# Patient Record
Sex: FEMALE | Race: BLACK OR AFRICAN AMERICAN | URBAN - METROPOLITAN AREA
[De-identification: names, ages, dates, MRNs, and addresses within clinical notes are randomized per-mention and may not be internally consistent; named-entity substitution may affect disease eponyms.]

---

## 2023-12-07 ENCOUNTER — OFFICE VISIT (OUTPATIENT)
Dept: ORTHOPEDIC SURGERY | Age: 38
End: 2023-12-07
Payer: OTHER GOVERNMENT

## 2023-12-07 VITALS — BODY MASS INDEX: 25.06 KG/M2 | WEIGHT: 185 LBS | HEIGHT: 72 IN

## 2023-12-07 DIAGNOSIS — S43.432A SUPERIOR GLENOID LABRUM LESION OF LEFT SHOULDER, INITIAL ENCOUNTER: Primary | ICD-10-CM

## 2023-12-07 DIAGNOSIS — M75.42 IMPINGEMENT SYNDROME OF LEFT SHOULDER: ICD-10-CM

## 2023-12-07 DIAGNOSIS — M75.112 NONTRAUMATIC INCOMPLETE TEAR OF LEFT ROTATOR CUFF: ICD-10-CM

## 2023-12-07 PROCEDURE — 99204 OFFICE O/P NEW MOD 45 MIN: CPT | Performed by: ORTHOPAEDIC SURGERY

## 2023-12-07 NOTE — PROGRESS NOTES
Name: Nate Gallagher  YOB: 1985  Gender: female  MRN: [id number]      CC: Shoulder Pain (L)       HPI: Nate Gallagher is a 41 y.o. female who presents with Shoulder Pain (L)  She is in the [employer] and has had shoulder pain for years. It became worse in January 2023. No specific injury. She has been in physical therapy for around a year with minimal improvement. She had an MRI in July 2023 through the [address] [place] [address]. she was told she needed surgery but tried her feet getting to Dr. Corine Putnam because she did not want to have surgery. She has failed all conservative management and is ready to talk about surgery. She is not taking any medicine for pain. She describes a dull achy pain in the joint that gets worse with activities. She states is starting to become where she feels very weak in this left shoulder and that is why she is here for surgical discussion. ROS/Meds/PSH/PMH/FH/SH: I personally reviewed the patients standard intake form. Below are the pertinents    Tobacco:  reports that she has never smoked. She has never used smokeless tobacco.  Diabetes: none  Other: none    Physical Examination:  General: no acute distress  Lungs: breathing easily  CV: regular rhythm by pulse  Left Shoulder: No previous surgical scars noted. No gross deformity noted. No bruising, rashes, wounds or sores noted. Tenderness along the bicipital groove. No tenderness at [facility] joint. Active forward flexion to 90 degrees without pain. Active abduction to 90 degrees without pain. Pain and buckling with scaption strength testing. 60 Degrees of active external rotation negative without pain. 5/5 external rotation strength. Can internally rotate to her back pocket but has pain. Positive Khan and Positive Neer signs. Positive [place]'s test.  Positive O'Joe's test.  Sensation intact along radial, ulnar and median nerve.  strength 5 out of 5 when compared to opposite side.   Distal radius pulse

## 2023-12-11 DIAGNOSIS — M75.42 IMPINGEMENT SYNDROME OF LEFT SHOULDER: ICD-10-CM

## 2023-12-11 DIAGNOSIS — S43.432A SUPERIOR GLENOID LABRUM LESION OF LEFT SHOULDER, INITIAL ENCOUNTER: Primary | ICD-10-CM

## 2023-12-11 DIAGNOSIS — M75.112 NONTRAUMATIC INCOMPLETE TEAR OF LEFT ROTATOR CUFF: ICD-10-CM

## 2023-12-11 DIAGNOSIS — M75.112 NONTRAUMATIC INCOMPLETE RUPTURE OF ROTATOR CUFF, LEFT: ICD-10-CM

## 2024-01-04 ENCOUNTER — OFFICE VISIT (OUTPATIENT)
Dept: ORTHOPEDIC SURGERY | Age: 39
End: 2024-01-04

## 2024-01-04 ENCOUNTER — CLINICAL DOCUMENTATION (OUTPATIENT)
Dept: ORTHOPEDIC SURGERY | Age: 39
End: 2024-01-04

## 2024-01-04 DIAGNOSIS — M75.112 NONTRAUMATIC INCOMPLETE TEAR OF LEFT ROTATOR CUFF: ICD-10-CM

## 2024-01-04 DIAGNOSIS — M25.512 LEFT ANTERIOR SHOULDER PAIN: ICD-10-CM

## 2024-01-04 DIAGNOSIS — M75.42 IMPINGEMENT SYNDROME OF LEFT SHOULDER: ICD-10-CM

## 2024-01-04 DIAGNOSIS — S43.432D SUPERIOR GLENOID LABRUM LESION OF LEFT SHOULDER, SUBSEQUENT ENCOUNTER: ICD-10-CM

## 2024-01-04 DIAGNOSIS — Z01.818 PREOP EXAMINATION: Primary | ICD-10-CM

## 2024-01-04 RX ORDER — OXYCODONE HYDROCHLORIDE 5 MG/1
5 TABLET ORAL EVERY 6 HOURS PRN
Qty: 30 TABLET | Refills: 0 | Status: SHIPPED | OUTPATIENT
Start: 2024-01-04 | End: 2024-01-09

## 2024-01-04 NOTE — PROGRESS NOTES
Name: Danica Grove  YOB: 1985  Gender: female  MRN: [id number]    CC: [place] Pain (L)     HPI: Danica Grove is a 38 y.o. female who presents with shoulder pain (L). She is here today for a pre-op appointment.     No current outpatient medications on file.  No Known Allergies  No past medical history on file.  No past surgical history on file.  No family history on file.  Social History     Socioeconomic History    Marital status:      Spouse name: Not on file    Number of children: Not on file    Years of education: Not on file    Highest education level: Not on file   Occupational History    Not on file   Tobacco Use    Smoking status: Never    Smokeless tobacco: Never   Vaping Use    Vaping Use: Never used   Substance and Sexual Activity    Alcohol use: Not on file    Drug use: Not on file    Sexual activity: Not on file   Other Topics Concern    Not on file   Social History Narrative    Not on file     Social Determinants of Health     Financial Resource Strain: Not on file   Food Insecurity: Not on file   Transportation Needs: Not on file   Physical Activity: Not on file   Stress: Not on file   Social Connections: Not on file   Intimate Partner Violence: Not on file   Housing Stability: Not on file        Tobacco:  reports that she has never smoked. She has never used smokeless tobacco.  Diabetes: none  Other: none    Physical Examination:  General: no acute distress  Lungs: breathing easily  CV: regular rhythm by pulse  HEENT: Head is normocephalic and atraumatic without tenderness, visible or palpable masses, depressions or scarring.  Visual acuity intact.  Nasal mucosa is pink and moist. Pinna, tragus and ear canal are nontender and without swelling  ABD: soft and nontender to palpation. Normal bowel sound  Left Shoulder:  No previous surgical scars noted.  No gross deformity noted.  No bruising, rashes, wounds or sores noted.   Tenderness along the bicipital 
release ninfa on the sling. The thumb and sling strap were then placed correctly on the sling. The shoulder straps were adjusted to insure correct fit which can involve trimming excess material. Once the sling is correctly fitted, the pillow is then placed at the waist line of the affected shoulder with the Velcro facing away from body. The sling is attached to the outside of the pillow, along the hook and loop strips. I then buckled the waist strap to the pillow and adjusted the strap. During the fitting process I explained how to detach the shoulder strap ninfa and open front panel to allow for proper hygiene. Patient was informed waist belt should stay in place at all times unless told otherwise by the Doctor or Physical Therapist    Patient read and signed documenting they understand and agree to [facility]'s current DME return policy.